# Patient Record
Sex: MALE | Race: BLACK OR AFRICAN AMERICAN | NOT HISPANIC OR LATINO | Employment: PART TIME | URBAN - METROPOLITAN AREA
[De-identification: names, ages, dates, MRNs, and addresses within clinical notes are randomized per-mention and may not be internally consistent; named-entity substitution may affect disease eponyms.]

---

## 2022-11-11 ENCOUNTER — APPOINTMENT (OUTPATIENT)
Dept: RADIOLOGY | Facility: HOSPITAL | Age: 47
End: 2022-11-11
Attending: EMERGENCY MEDICINE
Payer: OTHER MISCELLANEOUS

## 2022-11-11 ENCOUNTER — HOSPITAL ENCOUNTER (EMERGENCY)
Facility: HOSPITAL | Age: 47
Discharge: HOME | End: 2022-11-11
Attending: EMERGENCY MEDICINE | Admitting: EMERGENCY MEDICINE
Payer: OTHER MISCELLANEOUS

## 2022-11-11 VITALS
BODY MASS INDEX: 31.24 KG/M2 | RESPIRATION RATE: 20 BRPM | TEMPERATURE: 97.9 F | WEIGHT: 270 LBS | SYSTOLIC BLOOD PRESSURE: 148 MMHG | DIASTOLIC BLOOD PRESSURE: 78 MMHG | HEART RATE: 80 BPM | HEIGHT: 78 IN | OXYGEN SATURATION: 99 %

## 2022-11-11 DIAGNOSIS — S62.630B OPEN DISPLACED FRACTURE OF DISTAL PHALANX OF RIGHT INDEX FINGER, INITIAL ENCOUNTER: ICD-10-CM

## 2022-11-11 DIAGNOSIS — S61.210A LACERATION OF RIGHT INDEX FINGER WITHOUT FOREIGN BODY WITHOUT DAMAGE TO NAIL, INITIAL ENCOUNTER: ICD-10-CM

## 2022-11-11 PROCEDURE — 96365 THER/PROPH/DIAG IV INF INIT: CPT

## 2022-11-11 PROCEDURE — 250N000011 HC RX IP 250 OP 636: Performed by: EMERGENCY MEDICINE

## 2022-11-11 PROCEDURE — 12002 RPR S/N/AX/GEN/TRNK2.6-7.5CM: CPT

## 2022-11-11 PROCEDURE — 90714 TD VACC NO PRESV 7 YRS+ IM: CPT | Performed by: EMERGENCY MEDICINE

## 2022-11-11 PROCEDURE — 250N000013 HC RX MED GY IP 250 OP 250 PS 637: Performed by: EMERGENCY MEDICINE

## 2022-11-11 PROCEDURE — 99284 EMERGENCY DEPT VISIT MOD MDM: CPT | Mod: 25

## 2022-11-11 PROCEDURE — 26750 TREAT FINGER FRACTURE EACH: CPT | Mod: RT

## 2022-11-11 PROCEDURE — 90471 IMMUNIZATION ADMIN: CPT | Performed by: EMERGENCY MEDICINE

## 2022-11-11 PROCEDURE — 73140 X-RAY EXAM OF FINGER(S): CPT | Mod: RT

## 2022-11-11 RX ORDER — CEFAZOLIN SODIUM 1 G/3ML
1 INJECTION, POWDER, FOR SOLUTION INTRAMUSCULAR; INTRAVENOUS ONCE
Status: COMPLETED | OUTPATIENT
Start: 2022-11-11 | End: 2022-11-11

## 2022-11-11 RX ORDER — OXYCODONE HYDROCHLORIDE 5 MG/1
5 TABLET ORAL EVERY 6 HOURS PRN
Qty: 10 TABLET | Refills: 0 | Status: SHIPPED | OUTPATIENT
Start: 2022-11-11 | End: 2022-11-14

## 2022-11-11 RX ORDER — OXYCODONE AND ACETAMINOPHEN 5; 325 MG/1; MG/1
1 TABLET ORAL ONCE
Status: COMPLETED | OUTPATIENT
Start: 2022-11-11 | End: 2022-11-11

## 2022-11-11 RX ORDER — CEPHALEXIN 500 MG/1
500 CAPSULE ORAL 2 TIMES DAILY
Qty: 14 CAPSULE | Refills: 0 | Status: SHIPPED | OUTPATIENT
Start: 2022-11-11 | End: 2022-11-18

## 2022-11-11 RX ADMIN — CLOSTRIDIUM TETANI TOXOID ANTIGEN (FORMALDEHYDE INACTIVATED) AND CORYNEBACTERIUM DIPHTHERIAE TOXOID ANTIGEN (FORMALDEHYDE INACTIVATED) 0.5 ML: 5; 2 INJECTION, SUSPENSION INTRAMUSCULAR at 13:41

## 2022-11-11 RX ADMIN — OXYCODONE HYDROCHLORIDE AND ACETAMINOPHEN 1 TABLET: 5; 325 TABLET ORAL at 13:42

## 2022-11-11 RX ADMIN — CEFAZOLIN 1 G: 1 INJECTION, POWDER, FOR SOLUTION INTRAMUSCULAR; INTRAVENOUS at 16:20

## 2022-11-11 ASSESSMENT — ACTIVITIES OF DAILY LIVING (ADL)
ADLS_ACUITY_SCORE: 35
ADLS_ACUITY_SCORE: 35

## 2022-11-11 NOTE — ED NOTES
I have elevated the affected extremity and pt has been medicated for pain. Pt has turned his call light on twice and asked for additional pain medication. Providers in ED notified.

## 2022-11-11 NOTE — ED NOTES
ED Triage Provider Note  [facility] EMERGENCY DEPARTMENT  Encounter Date: Nov 11, 2022    History:  Chief Complaint   Patient presents with     Finger Injury     Haile Guadalupe is a 46 year old male who presents to the ED with right 2nd digit finger injury. Lawnmower injury. Tip is numb. Unsure of last TD.       Exam:  There were no vitals taken for this visit.  General: No acute distress. Appears stated age.   Resp: Normal work of breathing, grossly normal respiratory rate  Neuro: Alert. Facial movement grossly symmetric. Grossly intact strength.   Finger: open fracture dislocation to right 2nd digit      Medical Decision Making:  Patient arriving to the ED with problem as above. A medical screening exam was performed  TD, perocet, XR  orders initiated from Triage. The patient is most appropriate to return to the waiting room.       Fco Harp MD  11/11/2022 at 1:30 PM     Fco Harp MD  11/11/22 7009

## 2022-11-11 NOTE — ED TRIAGE NOTES
Presents to triage with boss for c/o right 2nd figure open fracture/laceration that occurred PTA.  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  Now has finger injury. Tetanus is not UTD.  Mild bleeding.     Triage Assessment     Row Name 11/11/22 7086       Triage Assessment (Adult)    Airway WDL WDL       Respiratory WDL    Respiratory WDL WDL       Skin Circulation/Temperature WDL    Skin Circulation/Temperature WDL WDL       Cardiac WDL    Cardiac WDL WDL       Peripheral/Neurovascular WDL    Peripheral Neurovascular WDL WDL       Cognitive/Neuro/Behavioral WDL    Cognitive/Neuro/Behavioral WDL WDL

## 2022-11-11 NOTE — DISCHARGE INSTRUCTIONS
You were seen in the Emergency Department today for evaluation of an injury to your right index finger.  Your imaging studies showed some fractures around that distal joint.  8 sutures were placed.  You were prescribed oxycodone and Keflex. You should take all medications as prescribed.  Follow up with your primary care physician to ensure resolution of symptoms. Return if you have new or worsening symptoms.

## 2022-11-11 NOTE — PLAN OF CARE
46 LHD  year old M with right index finger DIP laceration. + Abx and tetanus. Irrigated and closed by ER. Sent out on PO abx.   Will call the patient in the morning to plan for I&D, CRPP of finger on Monday morning.      Jo Oconnell MD  11/11/22 5:54 PM

## 2022-11-11 NOTE — ED PROVIDER NOTES
EMERGENCY DEPARTMENT ENCOUNTER      NAME: Haile Guadalupe  AGE: 46 year old male  YOB: 1975  MRN: [id number]  EVALUATION DATE & TIME: 11/11/2022  1:32 PM    PCP: No primary care provider on file.    ED PROVIDER: Kandace Loza M.D.      Chief Complaint   Patient presents with     Finger Injury     FINAL IMPRESSION:  1. Open displaced fracture of distal phalanx of right index finger, initial encounter    2. Laceration of right index finger without foreign body without damage to nail, initial encounter      ED COURSE & MEDICAL DECISION MAKING:    Pertinent Labs & Imaging studies reviewed. (See chart for details)  ED Course as of 11/11/22 2121 Fri Nov 11, 2022   1418 Patient is a 46-year-old male who is left-handed and comes in today with an injury to his right index finger.  He reports that he was reaching to remove some grass and excellently hit a button which caused the blade to hit his finger.  He has laceration over the dorsal aspect of the DIP joint.  Its about 3 cm.  His distal phalanx is sitting in a little bit of flexion.  He has no distal sensation and no distal movement of the finger.  He does report a prior injury to that finger so had some difficulty with flexion and extension in the past.  He said that finger was always a little bit flexed after his previous injury.  He denies any other injuries.  He was given tetanus today.  He was given some Percocet.  I will put a call out to hand surgery to get their recommendations.  X-ray does show fracture of both the middle and distal phalanx around the joint.   4313 I spoke with Dr. Oconnell with [facility].  She wants me to wash it out and solve the lack and put some pictures in the chart to get a better look at it.  We will then place him in a mallet splint and plan for him to follow-up on Tuesday in clinic.  She thinks he will likely need pins.  I will go anesthetize him and then get him washed out really good.   1605 Placed a total  of approximately 8 stitches in the patient's finger.  It was a complex wound.  I then covered it with nonstick and a stack splint.        2:13 PM I met with the patient for the initial interview and physical examination. Discussed plan for treatment and workup in the ED. PPE: Provider wore gloves, N95 mask, and surgical cap.   2:44 PM Spoke with Dr. Oconnell, Hand Surgery at [facility].   3:02 PM I rechecked and updated the patient.   3:19 PM Performed a laceration repair.   5:03 PM I rechecked and updated the patient. I discussed the plan for discharge with the patient, and patient is agreeable. We discussed supportive cares at home and reasons for return to the ER including new or worsening symptoms - all questions and concerns addressed. Patient to be discharged by RN.     Medical Decision Making    Supplemental history from: N/A    External Record(s) Reviewed: Other: Vaccine Records    Differential Diagnosis: See MDM charting for differential considered.     I performed an independent interpretation of the: N/A    Discussed with radiology regarding test interpretation: N/A    Discussion of management with another provider: See ED Course    The following testing was considered but ultimately not selected: None    I considered prescription management with: N/A    The patient's care impacted: None    Consideration of Admission/Observation: Admission/Observation considered but ultimately discharged: After discussion with hand surgery they felt like they could manage this as an outpatient.    Care significantly affected by Social Determinants of Health including: N/A    At the conclusion of the encounter I discussed  the results of all of the tests and the disposition with patient.   All questions were answered.  The patient acknowledged understanding and was involved in the decision making regarding the overall care plan.      I discussed with patient the utility, limitations and findings of the  exam/interventions/studies done during this visit as well as the list of differential diagnosis and symptoms to monitor/return to ER for.  Additional verbal discharge instructions were provided.     MEDICATIONS GIVEN IN THE EMERGENCY:  Medications   Td (tetanus & diphtheria toxoids) -  adult formulation - for ages 7 years and older (0.5 mLs Intramuscular Given 11/11/22 1341)   oxyCODONE-acetaminophen (PERCOCET) 5-325 MG per tablet 1 tablet (1 tablet Oral Given 11/11/22 1342)   ceFAZolin (ANCEF) 1 g vial to attach to  ml bag for ADULT or 50 ml bag for PEDS (0 g Intravenous Stopped 11/11/22 1726)       NEW PRESCRIPTIONS STARTED AT TODAY'S ER VISIT  Discharge Medication List as of 11/11/2022  5:26 PM      START taking these medications    Details   cephALEXin (KEFLEX) 500 MG capsule Take 1 capsule (500 mg) by mouth 2 times daily for 7 days, Disp-14 capsule, R-0, E-Prescribe      oxyCODONE (ROXICODONE) 5 MG tablet Take 1 tablet (5 mg) by mouth every 6 hours as needed for pain, Disp-10 tablet, R-0, E-Prescribe                =================================================================    HPI    Triage Note:   Presents to triage with boss for c/o right 2nd figure open fracture/laceration that occurred PTA.  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  Now has finger injury. Tetanus is not UTD.  Mild bleeding.     Triage Assessment     Row Name 11/11/22 [id number]       Triage Assessment (Adult)    Airway WDL WDL       Respiratory WDL    Respiratory WDL WDL       Skin Circulation/Temperature WDL    Skin Circulation/Temperature WDL WDL       Cardiac WDL    Cardiac WDL WDL       Peripheral/Neurovascular WDL    Peripheral Neurovascular WDL WDL       Cognitive/Neuro/Behavioral WDL    Cognitive/Neuro/Behavioral WDL WDL                  Patient information was obtained from: the patient     Use of : N/A         Haile Guadalupe is a 46 year old male who presents to the ED via walk in for  "evaluation of a finger injury.     The patient reports that he was pulling out leaves from his  while at work today, when the zipper of his jacket hit the top of the mower and turned it on. This caused the mower to cut his right index finger. He endorses right index finger pain and numbness. He is left handed. The patient denies and any other symptoms or complaints at this time.     REVIEW OF SYSTEMS   Except as stated in the HPI all other systems reviewed and are negative.    PAST MEDICAL HISTORY:  History reviewed. No pertinent past medical history.    PAST SURGICAL HISTORY:  History reviewed. No pertinent surgical history.    CURRENT MEDICATIONS:    No current facility-administered medications for this encounter.    Current Outpatient Medications:      cephALEXin (KEFLEX) 500 MG capsule, Take 1 capsule (500 mg) by mouth 2 times daily for 7 days, Disp: 14 capsule, Rfl: 0     oxyCODONE (ROXICODONE) 5 MG tablet, Take 1 tablet (5 mg) by mouth every 6 hours as needed for pain, Disp: 10 tablet, Rfl: 0    ALLERGIES:  No Known Allergies    FAMILY HISTORY:  History reviewed. No pertinent family history.    SOCIAL HISTORY:   Social History     Socioeconomic History     Marital status: Single     Spouse name: None     Number of children: None     Years of education: None     Highest education level: None       PHYSICAL EXAM    VITAL SIGNS: BP (!) 148/78   Pulse 80   Temp 97.9  F (36.6  C) (Oral)   Resp 20   Ht 1.981 m (6' 6\")   Wt 122.5 kg (270 lb)   SpO2 99%   BMI 31.20 kg/m     GENERAL: Awake, Alert, answering questions, No acute distress, Well nourished  HEENT: Normal cephalic, Atraumatic, bilateral external ears normal, No scleral icterus, mask in place  NECK: No obvious swelling or abnormality, No stridor  EXTREMITIES: Moves all extremities spontaneously, warm, no edema. 3.5 cm laceration on distal DIP joint of right index finger with no movement or sensation distally. Finger appears to be in a " slightly flexed position.   NEURO: No facial droop, normal motor function, Normal speech   PSYCH: Normal mood and affect  SKIN: No rashes on visualized skin, dry, warm.               RADIOLOGY:  XR Finger Right G/E 2 Views   Final Result   IMPRESSION: There is an open wound dorsal to the DIP joint of the index finger. There is a comminuted intra-articular fracture at the base of the distal phalanx and there is also a small intra-articular fracture involving the distal end of the middle    phalanx. Fracture fragments are displaced several millimeters. There is flexion at the DIP joint. No foreign body or osteomyelitis evident.             PROCEDURES:     PROCEDURE: Laceration Repair   INDICATIONS: Laceration   PROCEDURE PROVIDER: Dr Kandace Loza   SITE:  Right index finger   TYPE/SIZE: complex, subcutaneous, stellate, ragged edges and no foreign body visualized  3.5 cm (total length)   FUNCTIONAL ASSESSMENT: Distal circulation intact   MEDICATION: 12 mLs of 1% Lidocaine with epinephrine using a digital block   PREPARATION: irrigation with Normal saline   DEBRIDEMENT: wound explored, no foreign body found   CLOSURE:  Superficial layer closed with 8 stitches of 5-0 Ethilon simple interrupted    Total number of sutures/staples placed: 8    Stack splint was placed over the top for protection and splinting.     I, Amy Mathur, am serving as a scribe to document services personally performed by Dr. Loza based on my observation and the provider's statements to me. I, Kandace Loza MD attest that Amy Mathur is acting in a scribe capacity, has observed my performance of the services and has documented them in accordance with my direction.    Kandace Loza M.D.  Emergency Medicine  [facility] EMERGENCY DEPARTMENT  [facility] [facility] [address]  [phone]  Dept: [phone]      Kandace Loza MD  11/11/22 6704

## 2022-11-11 NOTE — ED NOTES
Tylenol offered and declined by patient because his pain is so severe, he feels he needs something stronger.